# Patient Record
Sex: FEMALE | Race: WHITE | NOT HISPANIC OR LATINO | Employment: OTHER | URBAN - METROPOLITAN AREA
[De-identification: names, ages, dates, MRNs, and addresses within clinical notes are randomized per-mention and may not be internally consistent; named-entity substitution may affect disease eponyms.]

---

## 2024-09-26 ENCOUNTER — OFFICE VISIT (OUTPATIENT)
Dept: OPHTHALMOLOGY | Facility: CLINIC | Age: 49
End: 2024-09-26
Payer: COMMERCIAL

## 2024-09-26 DIAGNOSIS — H10.13 ALLERGIC CONJUNCTIVITIS, BILATERAL: Primary | ICD-10-CM

## 2024-09-26 DIAGNOSIS — H52.7 REFRACTIVE ERRORS: ICD-10-CM

## 2024-09-26 PROCEDURE — 99999 PR PBB SHADOW E&M-NEW PATIENT-LVL II: CPT | Mod: PBBFAC,,, | Performed by: OPTOMETRIST

## 2024-09-26 RX ORDER — TRAZODONE HYDROCHLORIDE 100 MG/1
100 TABLET ORAL NIGHTLY
COMMUNITY
Start: 2024-08-08

## 2024-09-26 RX ORDER — DEXTROMETHORPHAN HYDROBROMIDE, BUPROPION HYDROCHLORIDE 105; 45 MG/1; MG/1
TABLET, MULTILAYER, EXTENDED RELEASE ORAL
COMMUNITY
Start: 2024-08-13

## 2024-09-26 RX ORDER — LORAZEPAM 1 MG/1
1 TABLET ORAL DAILY PRN
COMMUNITY
Start: 2024-08-13

## 2024-09-26 NOTE — PROGRESS NOTES
SUBJECTIVE  Apurva Mock is 48 y.o. female  Uncorrected distance visual acuity was 20/25 +1 in the right eye and 20/25 -1 in the left eye. Corrected near visual acuity was J3 in the right eye and J2 in the left eye. Comments: Checked near vision with otc readers +1.00..   Chief Complaint   Patient presents with    Annual Exam          HPI    New Patient   Last eye exam in 2023.    Patient states decrease with overall vision but more so near vision and   experiencing headaches.   Trouble with watery eyes and water blisters on eye.   Using otc drops prn.  Wear otc readers +1.00.  Last edited by Eliz Genao on 9/26/2024 11:17 AM.         Assessment /Plan :  1. Allergic conjunctivitis, bilateral  Pataday for allergy symptom such as chemosis or lymphangtaisa     2. Refractive errors  Dispense Final Rx for glasses.  RTC 1 year  Discussed above and answered questions.